# Patient Record
(demographics unavailable — no encounter records)

---

## 2022-02-02 NOTE — RAD
DATE: 2/2/2022



EXAM: MG 2D BILAT SCREENING



HISTORY: Screening. Family history of mother with breast cancer at age of 50.



COMPARISON: Multiple prior mammograms dating back to 12/30/2021



This study was interpreted with the benefit of Computerized Aided Detection (CAD).





Breast Density: SCATTERED The breast parenchyma shows scattered fibroglandular densities. Breast pare
nchyma level B.





FINDINGS: Intramammary lymph nodes in the upper outer breasts are unchanged. No suspicious mass, arch
itectural distortion, or suspicious calcifications.  





IMPRESSION: No evidence of malignancy.







BI-RADS CATEGORY: 2 BENIGN FINDING(S)



RECOMMENDED FOLLOW-UP: 12M 12 MONTH FOLLOW-UP



PQRS compliance statement: Patient information was entered into a reminder system with a target due d
ate for the next mammogram.



Mammography is a sensitive method for finding small breast cancers, but it does not detect them all a
nd is not a substitute for careful clinical examination.  A negative mammogram does not negate a clin
ically suspicious finding and should not result in delay in biopsying a clinically suspicious abnorma
lity.



"Our facility is accredited by the [employer] Mammography Program."









Electronically signed by: Beronica Montalvo MD (2/2/2022 10:54 AM) [id number]